# Patient Record
Sex: FEMALE | Race: WHITE | NOT HISPANIC OR LATINO | URBAN - METROPOLITAN AREA
[De-identification: names, ages, dates, MRNs, and addresses within clinical notes are randomized per-mention and may not be internally consistent; named-entity substitution may affect disease eponyms.]

---

## 2024-09-15 ENCOUNTER — EMERGENCY (EMERGENCY)
Facility: HOSPITAL | Age: 29
LOS: 1 days | Discharge: HOME | End: 2024-09-15
Attending: STUDENT IN AN ORGANIZED HEALTH CARE EDUCATION/TRAINING PROGRAM | Admitting: STUDENT IN AN ORGANIZED HEALTH CARE EDUCATION/TRAINING PROGRAM
Payer: COMMERCIAL

## 2024-09-15 VITALS
WEIGHT: 149.91 LBS | TEMPERATURE: 100 F | SYSTOLIC BLOOD PRESSURE: 103 MMHG | HEART RATE: 104 BPM | RESPIRATION RATE: 18 BRPM | HEIGHT: 65 IN | DIASTOLIC BLOOD PRESSURE: 73 MMHG | OXYGEN SATURATION: 99 %
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
## 2024-09-15 DIAGNOSIS — S90.852A SUPERFICIAL FOREIGN BODY, LEFT FOOT, INITIAL ENCOUNTER: ICD-10-CM

## 2024-09-15 DIAGNOSIS — L84 CORNS AND CALLOSITIES: ICD-10-CM

## 2024-09-15 DIAGNOSIS — R51.9 HEADACHE, UNSPECIFIED: ICD-10-CM

## 2024-09-15 DIAGNOSIS — R11.2 NAUSEA WITH VOMITING, UNSPECIFIED: ICD-10-CM
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
## 2024-09-15 DIAGNOSIS — R19.7 DIARRHEA, UNSPECIFIED: ICD-10-CM
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
## 2024-09-15 LAB
ALBUMIN SERPL ELPH-MCNC: 4.2 G/DL — SIGNIFICANT CHANGE UP (ref 3.3–5)
ALP SERPL-CCNC: 68 U/L — SIGNIFICANT CHANGE UP (ref 40–120)
ALT FLD-CCNC: 13 U/L — SIGNIFICANT CHANGE UP (ref 10–45)
ANION GAP SERPL CALC-SCNC: 10 MMOL/L — SIGNIFICANT CHANGE UP (ref 5–17)
ANISOCYTOSIS BLD QL: SLIGHT — SIGNIFICANT CHANGE UP
APPEARANCE UR: CLEAR — SIGNIFICANT CHANGE UP
AST SERPL-CCNC: 16 U/L — SIGNIFICANT CHANGE UP (ref 10–40)
BACTERIA # UR AUTO: ABNORMAL /HPF
BASOPHILS # BLD AUTO: 0 K/UL — SIGNIFICANT CHANGE UP (ref 0–0.2)
BASOPHILS NFR BLD AUTO: 0 % — SIGNIFICANT CHANGE UP (ref 0–2)
BILIRUB SERPL-MCNC: 1.2 MG/DL — SIGNIFICANT CHANGE UP (ref 0.2–1.2)
BILIRUB UR-MCNC: NEGATIVE — SIGNIFICANT CHANGE UP
BUN SERPL-MCNC: 11 MG/DL — SIGNIFICANT CHANGE UP (ref 7–23)
CALCIUM SERPL-MCNC: 8.6 MG/DL — SIGNIFICANT CHANGE UP (ref 8.4–10.5)
CHLORIDE SERPL-SCNC: 105 MMOL/L — SIGNIFICANT CHANGE UP (ref 96–108)
CO2 SERPL-SCNC: 24 MMOL/L — SIGNIFICANT CHANGE UP (ref 22–31)
COLOR SPEC: YELLOW — SIGNIFICANT CHANGE UP
CREAT SERPL-MCNC: 0.77 MG/DL — SIGNIFICANT CHANGE UP (ref 0.5–1.3)
DIFF PNL FLD: NEGATIVE — SIGNIFICANT CHANGE UP
EGFR: 108 ML/MIN/1.73M2 — SIGNIFICANT CHANGE UP
EOSINOPHIL # BLD AUTO: 0.06 K/UL — SIGNIFICANT CHANGE UP (ref 0–0.5)
EOSINOPHIL NFR BLD AUTO: 0.9 % — SIGNIFICANT CHANGE UP (ref 0–6)
GIANT PLATELETS BLD QL SMEAR: PRESENT — SIGNIFICANT CHANGE UP
GLUCOSE SERPL-MCNC: 103 MG/DL — HIGH (ref 70–99)
GLUCOSE UR QL: NEGATIVE MG/DL — SIGNIFICANT CHANGE UP
HCG SERPL-ACNC: <1 MIU/ML — SIGNIFICANT CHANGE UP
HCT VFR BLD CALC: 44.3 % — SIGNIFICANT CHANGE UP (ref 34.5–45)
HGB BLD-MCNC: 14.9 G/DL — SIGNIFICANT CHANGE UP (ref 11.5–15.5)
KETONES UR-MCNC: 15 MG/DL
LEUKOCYTE ESTERASE UR-ACNC: ABNORMAL
LIDOCAIN IGE QN: 12 U/L — SIGNIFICANT CHANGE UP (ref 7–60)
LYMPHOCYTES # BLD AUTO: 0.65 K/UL — LOW (ref 1–3.3)
LYMPHOCYTES # BLD AUTO: 10.4 % — LOW (ref 13–44)
MACROCYTES BLD QL: SLIGHT — SIGNIFICANT CHANGE UP
MANUAL SMEAR VERIFICATION: SIGNIFICANT CHANGE UP
MCHC RBC-ENTMCNC: 31.6 PG — SIGNIFICANT CHANGE UP (ref 27–34)
MCHC RBC-ENTMCNC: 33.6 GM/DL — SIGNIFICANT CHANGE UP (ref 32–36)
MCV RBC AUTO: 93.9 FL — SIGNIFICANT CHANGE UP (ref 80–100)
MICROCYTES BLD QL: SLIGHT — SIGNIFICANT CHANGE UP
MONOCYTES # BLD AUTO: 0.22 K/UL — SIGNIFICANT CHANGE UP (ref 0–0.9)
MONOCYTES NFR BLD AUTO: 3.5 % — SIGNIFICANT CHANGE UP (ref 2–14)
NEUTROPHILS # BLD AUTO: 5.32 K/UL — SIGNIFICANT CHANGE UP (ref 1.8–7.4)
NEUTROPHILS NFR BLD AUTO: 85.2 % — HIGH (ref 43–77)
NITRITE UR-MCNC: NEGATIVE — SIGNIFICANT CHANGE UP
OVALOCYTES BLD QL SMEAR: SLIGHT — SIGNIFICANT CHANGE UP
PH UR: 7 — SIGNIFICANT CHANGE UP (ref 5–8)
PLAT MORPH BLD: ABNORMAL
PLATELET # BLD AUTO: 235 K/UL — SIGNIFICANT CHANGE UP (ref 150–400)
POIKILOCYTOSIS BLD QL AUTO: SLIGHT — SIGNIFICANT CHANGE UP
POLYCHROMASIA BLD QL SMEAR: SLIGHT — SIGNIFICANT CHANGE UP
POTASSIUM SERPL-MCNC: 4 MMOL/L — SIGNIFICANT CHANGE UP (ref 3.5–5.3)
POTASSIUM SERPL-SCNC: 4 MMOL/L — SIGNIFICANT CHANGE UP (ref 3.5–5.3)
PROT SERPL-MCNC: 6.9 G/DL — SIGNIFICANT CHANGE UP (ref 6–8.3)
PROT UR-MCNC: NEGATIVE MG/DL — SIGNIFICANT CHANGE UP
RBC # BLD: 4.72 M/UL — SIGNIFICANT CHANGE UP (ref 3.8–5.2)
RBC # FLD: 11.7 % — SIGNIFICANT CHANGE UP (ref 10.3–14.5)
RBC BLD AUTO: ABNORMAL
RBC CASTS # UR COMP ASSIST: 3 /HPF — SIGNIFICANT CHANGE UP (ref 0–4)
SODIUM SERPL-SCNC: 139 MMOL/L — SIGNIFICANT CHANGE UP (ref 135–145)
SP GR SPEC: 1.02 — SIGNIFICANT CHANGE UP (ref 1–1.03)
SQUAMOUS # UR AUTO: 7 /HPF — HIGH (ref 0–5)
UROBILINOGEN FLD QL: 1 MG/DL — SIGNIFICANT CHANGE UP (ref 0.2–1)
WBC # BLD: 6.24 K/UL — SIGNIFICANT CHANGE UP (ref 3.8–10.5)
WBC # FLD AUTO: 6.24 K/UL — SIGNIFICANT CHANGE UP (ref 3.8–10.5)
WBC UR QL: 7 /HPF — HIGH (ref 0–5)

## 2024-09-15 PROCEDURE — 36415 COLL VENOUS BLD VENIPUNCTURE: CPT

## 2024-09-15 PROCEDURE — 80053 COMPREHEN METABOLIC PANEL: CPT

## 2024-09-15 PROCEDURE — 85025 COMPLETE CBC W/AUTO DIFF WBC: CPT

## 2024-09-15 PROCEDURE — 81001 URINALYSIS AUTO W/SCOPE: CPT

## 2024-09-15 PROCEDURE — 87086 URINE CULTURE/COLONY COUNT: CPT

## 2024-09-15 PROCEDURE — 96374 THER/PROPH/DIAG INJ IV PUSH: CPT | Mod: XU

## 2024-09-15 PROCEDURE — 10120 INC&RMVL FB SUBQ TISS SMPL: CPT

## 2024-09-15 PROCEDURE — 84702 CHORIONIC GONADOTROPIN TEST: CPT

## 2024-09-15 PROCEDURE — 28190 REMOVAL OF FOOT FOREIGN BODY: CPT | Mod: LT

## 2024-09-15 PROCEDURE — 99284 EMERGENCY DEPT VISIT MOD MDM: CPT | Mod: 25

## 2024-09-15 PROCEDURE — 83690 ASSAY OF LIPASE: CPT

## 2024-09-15 PROCEDURE — 96361 HYDRATE IV INFUSION ADD-ON: CPT | Mod: XU

## 2024-09-15 RX ORDER — ONDANSETRON 2 MG/ML
1 INJECTION, SOLUTION INTRAMUSCULAR; INTRAVENOUS
Qty: 1 | Refills: 0
Start: 2024-09-15

## 2024-09-15 RX ORDER — ONDANSETRON 2 MG/ML
4 INJECTION, SOLUTION INTRAMUSCULAR; INTRAVENOUS ONCE
Refills: 0 | Status: COMPLETED | OUTPATIENT
Start: 2024-09-15 | End: 2024-09-15

## 2024-09-15 RX ORDER — CEPHALEXIN 500 MG
1 CAPSULE ORAL
Qty: 28 | Refills: 0
Start: 2024-09-15 | End: 2024-09-21

## 2024-09-15 RX ORDER — SODIUM CHLORIDE 9 MG/ML
1000 INJECTION INTRAMUSCULAR; INTRAVENOUS; SUBCUTANEOUS ONCE
Refills: 0 | Status: COMPLETED | OUTPATIENT
Start: 2024-09-15 | End: 2024-09-15

## 2024-09-15 RX ORDER — IBUPROFEN 600 MG
600 TABLET ORAL ONCE
Refills: 0 | Status: COMPLETED | OUTPATIENT
Start: 2024-09-15 | End: 2024-09-15

## 2024-09-15 RX ADMIN — ONDANSETRON 4 MILLIGRAM(S): 2 INJECTION, SOLUTION INTRAMUSCULAR; INTRAVENOUS at 10:37

## 2024-09-15 RX ADMIN — SODIUM CHLORIDE 1000 MILLILITER(S): 9 INJECTION INTRAMUSCULAR; INTRAVENOUS; SUBCUTANEOUS at 10:37

## 2024-09-15 RX ADMIN — SODIUM CHLORIDE 1000 MILLILITER(S): 9 INJECTION INTRAMUSCULAR; INTRAVENOUS; SUBCUTANEOUS at 11:28

## 2024-09-15 RX ADMIN — Medication 600 MILLIGRAM(S): at 11:27

## 2024-09-15 NOTE — ED ADULT NURSE NOTE - OBJECTIVE STATEMENT
Pt arrived to ED c/o vomiting and decreased PO intake. Pt reports for the last 13 hours, pt hasn't been able to keep food or water down without n/v/d. Pt also concerned of concussion and foot infection from a splinter. denies cp, sob,

## 2024-09-15 NOTE — ED ADULT TRIAGE NOTE - CHIEF COMPLAINT QUOTE
"I can't keep any food or water down for the last 13 hours. I may also have a mild concussion and an infected splinter in my foot."

## 2024-09-15 NOTE — ED ADULT NURSE NOTE - NSFALLASSESSNEED_ED_ALL_ED
Type of service: Telemedicine Office Visit for fetal ultrasound    Date of service:  Date: 10/31/17     Time service began:  10:00 AM    Time service ended:  11:00 AM    Reason: .tel: Patient convenience    Description of basis or telemedicine appropriateness:     Consultation provided at the request of Dr. Geetha Jacobs for advice regarding the diagnosis and treatment of this patient's pregnancy.  The patient's condition can be safely assessed via telemedicine.    The Mode of Transmission:    Secure interactive audio and visual telecommunication system (Video Guidance)    Location of originating and distant sites:      Originating site:   [place], [place]    Distant site:    [place], [place]    Uvaldo Petty      
no

## 2024-09-15 NOTE — ED PROCEDURE NOTE - CPROC ED FOREIGN BODY DETAIL1
callous unroofed with 11 blade, small tract explored with suture scissor, small fb removed/The area was draped and prepped and the anatomic location of the suspected foreign body was explored in a bloodless field.

## 2024-09-15 NOTE — ED PROVIDER NOTE - SKIN, MLM
small callous with punctate dark spot beneath on plantar aspect of left foot on lateral aspect, no surrounding erythema

## 2024-09-15 NOTE — ED PROVIDER NOTE - PATIENT PORTAL LINK FT
You can access the FollowMyHealth Patient Portal offered by [facility] by registering at the following website: [email or web address]. By joining [employer]’s FollowMyHealth portal, you will also be able to view your health information using other applications (apps) compatible with our system.

## 2024-09-15 NOTE — ED PROVIDER NOTE - CLINICAL SUMMARY MEDICAL DECISION MAKING FREE TEXT BOX
28 y/o f presents c/o n/v/d over the past 12 hrs, splinter in left foot from 2 weeks ago.  Pt afebrile in ED, nontender abd on exam, labs unremarkable, nausea improved with zofran.  Pt tolerating PO, feeling improved.  Small splinter removed from left foot, will d/c on keflex, wound care instructions discussed with pt.  Recommend oral hydration, bland diet, f/u pmd, return to ED if sx worsen.

## 2024-09-15 NOTE — ED PROVIDER NOTE - OBJECTIVE STATEMENT
28 y/o f presents c/o n/v/d over the past 12 hrs.  Pt stating she was on a flight and ate something questionable which she thinks caused her sx.  Pt also reports walking into a door 2 days ago, hit her left forehead although not very hard and has had mild headache since.  Pt also c/o splinter in left foot for the past 2 weeks which has been painful.  Pt stating initially she thought she had a scrape on the foot but then looked and sees something small and black under the skin, having mild pain to the area.  Denies abd pain, fever, chills, CP, SOB, all other ROS negative.

## 2024-09-15 NOTE — ED PROVIDER NOTE - NSFOLLOWUPINSTRUCTIONS_ED_ALL_ED_FT
[place] Diet  A bland diet may consist of soft foods or foods that are not high in fat or are not greasy, acidic, or spicy. Avoiding certain foods may cause less irritation to your mouth, throat, stomach, or gastrointestinal tract. Avoiding certain foods may make you feel better. Everyone's tolerances are different. A bland diet should be based on what you can tolerate and what may cause discomfort.    What is my plan?  Your health care provider or dietitian may recommend specific changes to your diet to treat your symptoms. These changes may include:  Eating small meals frequently.  Cooking food until it is soft enough to chew easily.  Taking the time to chew your food thoroughly, so it is easy to swallow and digest.  Avoiding foods that cause you discomfort. These may include spicy food, fried food, greasy foods, hard-to-chew foods, or citrus fruits and juices.  Drinking slowly.  What are tips for following this plan?  Reading food labels    To reduce fiber intake, look for food labels that say "whole," such as whole wheat or whole grain.  Shopping    Avoid food items that may have nuts or seeds.  Avoid vegetables that may make you gassy or have a tough texture, such as broccoli, cauliflower, or corn.  Cooking    Cook foods thoroughly so they have a soft texture.  Meal planning    Make sure you include foods from all food groups to eat a balanced diet.  Eat a variety of types of foods.  Eat foods and drink beverages that do not cause you discomfort. These may include soups and broths with cooked meats, pasta, and vegetables.  Lifestyle    Sit up after meals, avoid tight clothing, and take time to eat and chew your food slowly.  Ask your health care provider whether you should take dietary supplements.  General information    Mildly season your foods. Some seasonings, such as cayenne pepper, vinegar, or hot sauce, may cause irritation.  The foods, beverages, or seasonings to avoid should be based on individual tolerance.  What foods should I eat?  Fruits    Canned or cooked fruit such as peaches, pears, or applesauce. Bananas.    Vegetables    Well-cooked vegetables. Canned or cooked vegetables such as carrots, green beans, beets, or spinach. Mashed or boiled potatoes.    Grains    Bread, rice, and cereal from the grain group.  Hot cereals, such as cream of wheat and processed oatmeal. Rice. Bread, crackers, pasta, or tortillas made from refined white flour.    Meats and other proteins    Chicken, fish, and eggs.  Eggs. Creamy peanut butter or other nut butters. Lean, well-cooked tender meats, such as beef, pork, chicken, or fish.    Dairy    Low-fat dairy products such as milk, cottage cheese, or yogurt.    Beverages    A cup of hot tea.  Water. Herbal tea. Apple juice.    Fats and oils    Mild salad dressings. Canola or olive oil.    Sweets and desserts    Low-fat pudding, custard, or ice cream. Fruit gelatin.    The items listed above may not be a complete list of foods and beverages you can eat. Contact a dietitian for more information.    What foods should I avoid?  Fruits    Citrus fruits, such as oranges and grapefruit. Fruits with a stringy texture. Fruits that have lots of seeds, such as kiwi or strawberries. Dried fruits.    Vegetables    Raw, uncooked vegetables. Salads.    Grains    Whole grain breads, muffins, and cereals.    Meats and other proteins    Tough, fibrous meats. Highly seasoned meat such as corned beef, smoked meats, or fish. Processed high-fat meats such as brats, hot dogs, or sausage.    Dairy    Full-fat dairy foods such as ice cream and cheese.    Beverages    Caffeinated drinks. Alcohol.    Seasonings and condiments    Strongly flavored seasonings or condiments. Hot sauce. Salsa.    Other foods    Spicy foods. Fried or greasy foods. Sour foods, such as pickled or fermented foods like sauerkraut. Foods high in fiber.    The items listed above may not be a complete list of foods and beverages you should avoid. Contact a dietitian for more information.    Summary  A bland diet should be based on individual tolerance. It may consist of foods that are soft textured and do not have a lot of fat, fiber, acid, or seasonings.  A bland diet may be recommended because avoiding certain foods, beverages, or spices may make you feel better.  This information is not intended to replace advice given to you by your health care provider. Make sure you discuss any questions you have with your health care provider.

## 2024-09-17 LAB
CULTURE RESULTS: SIGNIFICANT CHANGE UP
SPECIMEN SOURCE: SIGNIFICANT CHANGE UP